# Patient Record
Sex: MALE | Race: OTHER | URBAN - METROPOLITAN AREA
[De-identification: names, ages, dates, MRNs, and addresses within clinical notes are randomized per-mention and may not be internally consistent; named-entity substitution may affect disease eponyms.]

---

## 2019-09-29 ENCOUNTER — EMERGENCY (EMERGENCY)
Facility: HOSPITAL | Age: 24
LOS: 0 days | Discharge: HOME | End: 2019-09-29
Admitting: STUDENT IN AN ORGANIZED HEALTH CARE EDUCATION/TRAINING PROGRAM
Payer: COMMERCIAL

## 2019-09-29 VITALS
RESPIRATION RATE: 20 BRPM | OXYGEN SATURATION: 100 % | TEMPERATURE: 98 F | DIASTOLIC BLOOD PRESSURE: 87 MMHG | WEIGHT: 199.96 LBS | HEART RATE: 79 BPM | SYSTOLIC BLOOD PRESSURE: 135 MMHG | HEIGHT: 72 IN

## 2019-09-29 DIAGNOSIS — Z88.1 ALLERGY STATUS TO OTHER ANTIBIOTIC AGENTS STATUS: ICD-10-CM
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
## 2019-09-29 DIAGNOSIS — S09.90XA UNSPECIFIED INJURY OF HEAD, INITIAL ENCOUNTER: ICD-10-CM

## 2019-09-29 DIAGNOSIS — S00.11XA CONTUSION OF RIGHT EYELID AND PERIOCULAR AREA, INITIAL ENCOUNTER: ICD-10-CM
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
## 2019-09-29 DIAGNOSIS — M25.512 PAIN IN LEFT SHOULDER: ICD-10-CM
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
## 2019-09-29 PROCEDURE — 73030 X-RAY EXAM OF SHOULDER: CPT | Mod: 26,LT

## 2019-09-29 PROCEDURE — 99283 EMERGENCY DEPT VISIT LOW MDM: CPT

## 2019-09-29 NOTE — ED PROVIDER NOTE - PATIENT PORTAL LINK FT
You can access the FollowMyHealth Patient Portal offered by [facility] by registering at the following website: [email or web address]. By joining [employer]’s FollowMyHealth portal, you will also be able to view your health information using other applications (apps) compatible with our system.

## 2019-09-29 NOTE — ED PROVIDER NOTE - NS ED ROS FT
Constitutional: no fever, chills  Eyes: + right upper eyelid/eyebrow swelling and ecchymosis. no visual changes, no eye pain, no discharge or erythema, no photophobia  Cardiovascular: no chest pain, no sob  Respiratory: no cough, no shortness of breath  Gastrointestinal: no nausea, vomiting. no abdominal pain. no melena or BRBPR. no prior abdominal surgeries.   Musculoskeletal: + left shoulder pain. no neck pain, no back pain, no joint pain.   Integumentary: + swelling and ecchymosis to   Neurological: no headache, no dizziness, no visual changes, no UE/LE weakness or paresthesias. no change in mental status.

## 2019-09-29 NOTE — ED PROVIDER NOTE - PHYSICAL EXAMINATION
GENERAL:  well appearing, non-toxic male in no acute distress  SKIN: skin warm, pink and dry. MMM. + swelling and ecchymosis to right eyebrow and upper eyelid. no abrasions or lacerations. cap refill < 2 sec   HEAD: NC, AT  EYE: Normal lids, conjunctiva and sclera, PERRL, EOMI, no pain with EOM, no palsy. + swelling and ecchymosis to right eyebrow and upper eyelid.   ENT:  Airway intact. no facial bony tenderness. Patent oropharynx without erythema or exudate. Uvula midline. TMs clear b/l.   NECK: Neck supple. No midline cervical tenderness. FROM of neck.   MSK: + TTP left anterior shoulder with decreased ROM due to pain. no deformity. No edema, erythema, cyanosis. radial pulses equal and intact bilaterally   NEURO: A+Ox3, no sensory/motor deficits. CN II-XII intact.
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
## 2019-09-29 NOTE — ED PROVIDER NOTE - CARE PROVIDER_API CALL
Solomon Ngo)  Orthopaedic Surgery  [address] [place] [address]  Phone: [phone]  Fax: [phone]  Follow Up Time:

## 2019-09-29 NOTE — ED PROVIDER NOTE - NSFOLLOWUPINSTRUCTIONS_ED_ALL_ED_FT
Shoulder Pain    Many things can cause shoulder pain, including:    An injury to the area.  Overuse of the shoulder.  Arthritis.    The source of the pain can be:    Inflammation.  An injury to the shoulder joint.  An injury to a tendon, ligament, or bone.    HOME CARE INSTRUCTIONS  Take these actions to help with your pain:     Squeeze a soft ball or a foam pad as much as possible. This helps to keep the shoulder from swelling. It also helps to strengthen the arm.  Take over-the-counter and prescription medicines only as told by your health care provider.  If directed, apply ice to the area:  Put ice in a plastic bag.  Place a towel between your skin and the bag.  Leave the ice on for 20 minutes, 2–3 times per day. Stop applying ice if it does not help with the pain.  If you were given a shoulder sling or immobilizer:  Wear it as told.  Remove it to shower or bathe.  Move your arm as little as possible, but keep your hand moving to prevent swelling.    SEEK MEDICAL CARE IF:  Your pain gets worse.  Your pain is not relieved with medicines.  New pain develops in your arm, hand, or fingers.    SEEK IMMEDIATE MEDICAL CARE IF:  Your arm, hand, or fingers:  Tingle.  Become numb.  Become swollen.  Become painful.  Turn white or blue.    ADDITIONAL NOTES AND INSTRUCTIONS    Please follow up with your Primary MD in 24-48 hr.  Seek immediate medical care for any new/worsening signs or symptoms.     Closed Head Injury    A closed head injury is an injury to your head that may or may not involve a traumatic brain injury (TBI). Symptoms of TBI can be short or long lasting and include headache, dizziness, interference with memory or speech, fatigue, confusion, changes in sleep, mood changes, nausea, depression/anxiety, and dulling of senses. Make sure to obtain proper rest which includes getting plenty of sleep, avoiding excessive visual stimulation, and avoiding activities that may cause physical or mental stress. Avoid any situation where there is potential for another head injury, including sports.    SEEK IMMEDIATE MEDICAL CARE IF YOU HAVE ANY OF THE FOLLOWING SYMPTOMS: unusual drowsiness, vomiting, severe dizziness, seizures, lightheadedness, muscular weakness, different pupil sizes, visual changes, or clear or bloody discharge from your ears or nose.

## 2019-09-29 NOTE — ED PROCEDURE NOTE - CPROC ED POST PROC CARE GUIDE1
Keep the cast/splint/dressing clean and dry./Verbal/written post procedure instructions were given to patient/caregiver./Elevate the injured extremity as instructed./Instructed patient/caregiver to follow-up with primary care physician./Instructed patient/caregiver regarding signs and symptoms of infection.

## 2019-09-29 NOTE — ED PROVIDER NOTE - CARE PLAN
Principal Discharge DX:	Left shoulder pain  Secondary Diagnosis:	Eyebrow contusion  Secondary Diagnosis:	Abrasion Principal Discharge DX:	Left shoulder pain  Secondary Diagnosis:	Eyebrow contusion  Secondary Diagnosis:	CHI (closed head injury)
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
## 2020-02-24 ENCOUNTER — TRANSCRIPTION ENCOUNTER (OUTPATIENT)
Age: 25
End: 2020-02-24

## 2022-03-15 ENCOUNTER — EMERGENCY (EMERGENCY)
Facility: HOSPITAL | Age: 27
LOS: 0 days | Discharge: HOME | End: 2022-03-16
Attending: EMERGENCY MEDICINE | Admitting: EMERGENCY MEDICINE
Payer: COMMERCIAL

## 2022-03-15 VITALS
WEIGHT: 199.96 LBS | DIASTOLIC BLOOD PRESSURE: 86 MMHG | HEIGHT: 72 IN | RESPIRATION RATE: 20 BRPM | OXYGEN SATURATION: 100 % | TEMPERATURE: 98 F | SYSTOLIC BLOOD PRESSURE: 139 MMHG | HEART RATE: 95 BPM

## 2022-03-15 DIAGNOSIS — Z88.0 ALLERGY STATUS TO PENICILLIN: ICD-10-CM

## 2022-03-15 DIAGNOSIS — M79.644 PAIN IN RIGHT FINGER(S): ICD-10-CM
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
## 2022-03-15 DIAGNOSIS — S62.636B DISPLACED FRACTURE OF DISTAL PHALANX OF RIGHT LITTLE FINGER, INITIAL ENCOUNTER FOR OPEN FRACTURE: ICD-10-CM

## 2022-03-15 PROCEDURE — 99284 EMERGENCY DEPT VISIT MOD MDM: CPT

## 2022-03-15 PROCEDURE — 73140 X-RAY EXAM OF FINGER(S): CPT | Mod: 26,RT

## 2022-03-15 RX ORDER — IBUPROFEN 200 MG
600 TABLET ORAL ONCE
Refills: 0 | Status: COMPLETED | OUTPATIENT
Start: 2022-03-15 | End: 2022-03-15

## 2022-03-15 RX ADMIN — Medication 600 MILLIGRAM(S): at 22:34

## 2022-03-15 RX ADMIN — Medication 300 MILLIGRAM(S): at 23:54

## 2022-03-15 NOTE — ED PROVIDER NOTE - ATTENDING CONTRIBUTION TO CARE
pt c/o R 5th finger injury from gym weights.     pt in mild dist 2/2 pain.     R 5th distal finger with about 4mm of distal tip avulsion, including part of nail/plate. prox nail and nail bed intact. FROM. pulp exposed.     no wound to close. will apply quick clot, wound dressing. tet utd. abx, imaging, pain control via digital block.

## 2022-03-15 NOTE — ED PROVIDER NOTE - NS ED ATTENDING STATEMENT MOD
This was a shared visit with the KIARA. I reviewed and verified the documentation and independently performed the documented:

## 2022-03-15 NOTE — ED PROVIDER NOTE - OBJECTIVE STATEMENT
26 year old male, no past medical history, who presents with finger injury. patient states he crushed R index finger in between weights. patient with persistent bleeding prompting ed eval. denies numbness/weakness or decreased ROM. tetanus utd. 26 year old male, no past medical history, who presents with finger injury. patient states he crushed R 5th finger in between weights. patient with persistent bleeding prompting ed eval. denies numbness/weakness or decreased ROM. tetanus utd.

## 2022-03-15 NOTE — ED PROVIDER NOTE - PROGRESS NOTE DETAILS
patient with local wound care - stabilization of distal phalanx applied, patient to fu with nikos, hand specialist tomorrow. given strict return precautions and sent abx. wound care applied, bleeding stopped - stabilization of distal phalanx applied, patient to fu with nikos, hand specialist tomorrow. given strict return precautions and sent abx.

## 2022-03-15 NOTE — ED ADULT TRIAGE NOTE - CHIEF COMPLAINT QUOTE
pt biba, got right pinky caught between two weights and "it was crushed and there is skin missing from the tip" pt biba, got right pinky caught between two weights and "it was crushed and there is skin missing from the tip"  as per MD Small, pt ok for urgent care

## 2022-03-15 NOTE — ED PROVIDER NOTE - PHYSICAL EXAMINATION
CONSTITUTIONAL: Well-developed; well-nourished; in no acute distress, nontoxic appearing  SKIN: crush injury to R index finger involving distal tip of digit, nailbed intact, no pulsatile bleeding  EXT: Normal ROM. No cyanosis or edema. pulses 2+  NEURO: awake, alert, following commands, oriented, grossly unremarkable. No Focal deficits. GCS 15.   PSYCH: Cooperative, appropriate. CONSTITUTIONAL: Well-developed; well-nourished; in no acute distress, nontoxic appearing  SKIN: crush injury to R 5th finger involving distal tip of digit, nailbed intact, no pulsatile bleeding  EXT: Normal ROM. No cyanosis or edema. pulses 2+  NEURO: awake, alert, following commands, oriented, grossly unremarkable. No Focal deficits. GCS 15.   PSYCH: Cooperative, appropriate.

## 2022-03-15 NOTE — ED PROVIDER NOTE - PROVIDER TOKENS
FREE:[LAST:[[facility]],PHONE:[phone]],FAX:[(   [phone]    -],ADDRESS:[[address] [place] [address]],FOLLOWUP:[1-3 Days]],PROVIDER:[TOKEN:[[id number]],FOLLOWUP:[1-3 Days]]

## 2022-03-15 NOTE — ED PROVIDER NOTE - CARE PROVIDER_API CALL
[facility],   [address] [place] [id number]  Phone: [phone]  Fax: (   )    -  Follow Up Time: 1-3 Days    Solomon Ngo)  Orthopaedic Surgery  [address] [place] [address]  Phone: [phone]  Fax: [phone]  Follow Up Time: 1-3 Days

## 2022-03-15 NOTE — ED PROVIDER NOTE - CLINICAL SUMMARY MEDICAL DECISION MAKING FREE TEXT BOX
R 5th finger distal tip avulsion via crush injury. bleeding controlled. abx given. dressing placed. will need hand f/u for wound care.

## 2022-03-15 NOTE — ED PROVIDER NOTE - NSFOLLOWUPINSTRUCTIONS_ED_ALL_ED_FT
Please follow up with your primary care doctor and orthopedics in 1-2 days   Please be aware of any new or worsening signs or symptoms that should prompt your return to the ER.      The tuft is the end of the last bone in the finger. Symptoms of a tuft fracture include pain and swelling in the fingertip. The pain and swelling usually decrease after a day or two, but the fingertip will still be tender for several weeks if you bump it. A splint is often put on a finger to treat a tuft fracture.    What is a finger fracture?  A "fracture" is another word for a broken bone. A finger fracture is when a person breaks a finger bone (figure 1).    There are different types of fractures. The type of fracture depends on how the bone breaks and whether the broken bone sticks out of the skin or not.    What are the symptoms of a finger fracture?  Symptoms of a finger fracture include:    ?Pain    ?Swelling    ?Bruising    ?Stiffness    ?Weakness of the hand    A finger fracture can also make the finger bent in an abnormal position.    Will I need tests?  Yes. Your doctor or nurse will ask about your symptoms, do an exam, and do an X-ray.    Is there anything I can do on my own to reduce swelling?  Yes. For the first 1 to 2 days after your injury, you can:    ?Try to keep your finger raised above the level of your heart.    ?Put a cold gel pack, bag of ice, or bag of frozen vegetables on the injured area every 1 to 2 hours, for 15 minutes each time. Put a thin towel between the ice (or other cold object) and your skin. Use the ice (or other cold object) for at least 6 hours after your injury. Some people find it helpful to ice longer, even up to 2 days after their injury.    How are finger fractures treated?  Treatment depends on the type of finger fracture you have and how severe it is.    If you have a lot of pain or a severe fracture, your doctor will prescribe a strong pain medicine. If your fracture is mild, your doctor might recommend that you take an over-the-counter medicine for your pain. Over-the-counter medicines include acetaminophen (sample brand name: Tylenol), ibuprofen (sample brand names: Advil, Motrin), and naproxen (sample brand names: Aleve, Naprosyn).    Finger fractures are usually treated with a splint, "rigo taping," or both. Rigo taping involves taping your injured finger to the finger next to it (picture 1). Before your doctor puts on a splint, they will make sure that your finger bones are in the correct position. If your bones are not in the correct position, they might need to do a procedure to put your bones in the correct position.    Severe fractures or fractures that involve the bone sticking out of the skin are usually treated by a specialist called a hand surgeon. Treatment for these types of fractures usually involves surgery. Your doctor might also prescribe an antibiotic medicine to prevent an infection if your bone is sticking out of your skin.    After your splint comes off, your doctor might recommend that you work with a physical therapist (exercise expert). They can show you exercises and stretches to strengthen your finger muscles and keep your fingers from getting stiff.    How long do finger fractures take to heal?  Fractures can take weeks to months to heal, depending on the type of fracture.    Healing time also depends on the person. Healthy children usually heal very quickly. Older adults or adults with other medical problems can take much longer to heal.    Can I do anything to improve the healing process?  Yes. It's important to follow all of your doctor's instructions while your fracture is healing. They will probably recommend that you eat a healthy diet that includes getting enough calcium, vitamin D, and protein (figure 2). They will also probably recommend that you avoid doing certain things. For example, they might recommend that you:    ?Avoid doing certain activities.    ?Avoid smoking. A fracture can take longer to heal if you smoke.    ?Avoid damaging your cast or getting it wet, if you have a cast that shouldn't get wet.    When should I call my doctor or nurse?  Your doctor or nurse will tell you when to call them. In general, you should call them if:    ?You have severe pain, or your pain or swelling gets worse.    ?You have numbness or tingling in your fingers, or your fingers look blue or purple.    ?You damage your cast or get it wet, and it's not supposed to get wet.

## 2022-03-15 NOTE — ED PROVIDER NOTE - PATIENT PORTAL LINK FT
You can access the FollowMyHealth Patient Portal offered by [facility] by registering at the following website: [email or web address]. By joining [employer]’s FollowMyHealth portal, you will also be able to view your health information using other applications (apps) compatible with our system.

## 2022-03-15 NOTE — ED PROVIDER NOTE - CARE PROVIDERS DIRECT ADDRESSES
,DirectAddress_Unknown,[email or web address]

## 2022-03-16 RX ORDER — OXYCODONE AND ACETAMINOPHEN 5; 325 MG/1; MG/1
1 TABLET ORAL
Qty: 12 | Refills: 0
Start: 2022-03-16 | End: 2022-03-18

## 2022-03-16 NOTE — ED ADULT NURSE NOTE - OBJECTIVE STATEMENT
Pt BIBA due to injury to right pinky on left hand. Pt states he had his right pinky caught between two weights and "it was crushed and there is skin missing from the tip." Pt accompanied by family.

## 2022-03-16 NOTE — ED ADULT NURSE NOTE - CHIEF COMPLAINT QUOTE
pt biba, got right pinky caught between two weights and "it was crushed and there is skin missing from the tip"  as per MD Small, pt ok for urgent care

## 2022-03-18 NOTE — ASU PATIENT PROFILE, ADULT - FALL HARM RISK - UNIVERSAL INTERVENTIONS
Bed in lowest position, wheels locked, appropriate side rails in place/Call bell, personal items and telephone in reach/Instruct patient to call for assistance before getting out of bed or chair/Non-slip footwear when patient is out of bed/[place] to call system/Physically safe environment - no spills, clutter or unnecessary equipment/Purposeful Proactive Rounding/Room/bathroom lighting operational, light cord in reach

## 2022-03-21 ENCOUNTER — OUTPATIENT (OUTPATIENT)
Dept: OUTPATIENT SERVICES | Facility: HOSPITAL | Age: 27
LOS: 1 days | Discharge: HOME | End: 2022-03-21

## 2022-03-21 VITALS
TEMPERATURE: 99 F | HEART RATE: 67 BPM | DIASTOLIC BLOOD PRESSURE: 80 MMHG | SYSTOLIC BLOOD PRESSURE: 131 MMHG | RESPIRATION RATE: 18 BRPM | HEIGHT: 72 IN | OXYGEN SATURATION: 98 % | WEIGHT: 214.95 LBS

## 2022-03-21 VITALS
SYSTOLIC BLOOD PRESSURE: 130 MMHG | OXYGEN SATURATION: 100 % | RESPIRATION RATE: 12 BRPM | DIASTOLIC BLOOD PRESSURE: 79 MMHG | HEART RATE: 54 BPM

## 2022-03-21 RX ORDER — SODIUM CHLORIDE 9 MG/ML
1000 INJECTION, SOLUTION INTRAVENOUS
Refills: 0 | Status: DISCONTINUED | OUTPATIENT
Start: 2022-03-21 | End: 2022-04-04

## 2022-03-21 RX ORDER — ACETAMINOPHEN 500 MG
650 TABLET ORAL ONCE
Refills: 0 | Status: DISCONTINUED | OUTPATIENT
Start: 2022-03-21 | End: 2022-04-04

## 2022-03-21 RX ORDER — HYDROMORPHONE HYDROCHLORIDE 2 MG/ML
0.5 INJECTION INTRAMUSCULAR; INTRAVENOUS; SUBCUTANEOUS ONCE
Refills: 0 | Status: DISCONTINUED | OUTPATIENT
Start: 2022-03-21 | End: 2022-03-21

## 2022-03-21 RX ADMIN — SODIUM CHLORIDE 100 MILLILITER(S): 9 INJECTION, SOLUTION INTRAVENOUS at 11:37

## 2022-03-21 NOTE — CHART NOTE - NSCHARTNOTEFT_GEN_A_CORE
PACU ANESTHESIA ADMISSION NOTE      Procedure: Revision, amputation, fingertip  r sf      Post op diagnosis:  Amputation of finger tip        ____  Intubated  TV:______       Rate: ______      FiO2: ______    __x__  Patent Airway    __x__  Full return of protective reflexes    __x__  Full recovery from anesthesia / back to baseline         Mental Status:  ___ Awake   _____ Alert   ___x__ Drowsy   _____ Sedated    Nausea/Vomiting:  _x___ NO  ______Yes,   __x__ See Post - Op Orders          Pain Scale (0-10):  __0___    Treatment: ____ None    __x__ See Post - Op/PCA Orders    Post - Operative Fluids:   ____ Oral   __x__ See Post - Op Orders    Plan: Discharge:   _x___Home       _____Floor     _____Critical Care    _____  Other:_________________    Comments: When parameters met.

## 2022-03-21 NOTE — ASU DISCHARGE PLAN (ADULT/PEDIATRIC) - NS MD DC FALL RISK RISK
For information on Fall & Injury Prevention, visit: [email or web address] OR  [email or web address] OR  https://www.cdc.gov/steadi/patient.html
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
## 2022-03-24 DIAGNOSIS — S68.626A: ICD-10-CM

## 2022-03-24 DIAGNOSIS — Z88.0 ALLERGY STATUS TO PENICILLIN: ICD-10-CM

## 2022-06-21 NOTE — ED ADULT NURSE NOTE - NSFALLRSKASSESASSIST_ED_ALL_ED
no Simple: Patient demonstrates quick and easy understanding/Patient asked questions/Verbalized Understanding

## 2022-09-20 NOTE — ED ADULT NURSE NOTE - NSSEPSISNEWALTERMENTAL_ED_A_ED
subjective decreased sensation to LUE/LLE. No saddle anesthesia.   PERRL, EOMI, no nystagmus. CN intact. Strength 5/5. Steady unassisted gait. No pronator drift. Normal speech, no dysarthria. No

## 2023-01-12 NOTE — ASU DISCHARGE PLAN (ADULT/PEDIATRIC) - CARE PROVIDER_API CALL
mfERG done Alanna Box (MD)  Surgery of the Hand  [facility] [place] [address]  Phone: [phone]  Fax: [phone]  Follow Up Time: